# Patient Record
Sex: MALE | Race: WHITE | Employment: STUDENT | URBAN - METROPOLITAN AREA
[De-identification: names, ages, dates, MRNs, and addresses within clinical notes are randomized per-mention and may not be internally consistent; named-entity substitution may affect disease eponyms.]

---
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
## 2019-03-13 ENCOUNTER — OFFICE VISIT (OUTPATIENT)
Dept: FAMILY MEDICINE | Facility: CLINIC | Age: 19
End: 2019-03-13

## 2019-03-13 ENCOUNTER — TELEPHONE (OUTPATIENT)
Dept: FAMILY MEDICINE | Facility: CLINIC | Age: 19
End: 2019-03-13

## 2019-03-13 VITALS
OXYGEN SATURATION: 97 % | TEMPERATURE: 98 F | HEART RATE: 54 BPM | RESPIRATION RATE: 16 BRPM | HEIGHT: 72 IN | DIASTOLIC BLOOD PRESSURE: 79 MMHG | SYSTOLIC BLOOD PRESSURE: 139 MMHG | WEIGHT: 185 LBS | BODY MASS INDEX: 25.06 KG/M2

## 2019-03-13 DIAGNOSIS — J31.0 CHRONIC RHINITIS: Primary | ICD-10-CM

## 2019-03-13 DIAGNOSIS — Z23 NEED FOR HPV VACCINE: ICD-10-CM

## 2019-03-13 PROCEDURE — 99213 OFFICE O/P EST LOW 20 MIN: CPT | Mod: 25 | Performed by: FAMILY MEDICINE

## 2019-03-13 PROCEDURE — 90471 IMMUNIZATION ADMIN: CPT | Performed by: FAMILY MEDICINE

## 2019-03-13 PROCEDURE — 90651 9VHPV VACCINE 2/3 DOSE IM: CPT | Performed by: FAMILY MEDICINE

## 2019-03-13 ASSESSMENT — MIFFLIN-ST. JEOR: SCORE: 1887.15

## 2019-03-13 NOTE — NURSING NOTE
Screening Questionnaire for Adult Immunization    Are you sick today?   No   Do you have allergies to medications, food, a vaccine component or latex?   No   Have you ever had a serious reaction after receiving a vaccination?   No   Do you have a long-term health problem with heart disease, lung disease, asthma, kidney disease, metabolic disease (e.g. diabetes), anemia, or other blood disorder?   No   Do you have cancer, leukemia, HIV/AIDS, or any other immune system problem?   No   In the past 3 months, have you taken medications that affect  your immune system, such as prednisone, other steroids, or anticancer drugs; drugs for the treatment of rheumatoid arthritis, Crohn s disease, or psoriasis; or have you had radiation treatments?   No   Have you had a seizure, or a brain or other nervous system problem?   No   During the past year, have you received a transfusion of blood or blood     products, or been given immune (gamma) globulin or antiviral drug?   No   For women: Are you pregnant or is there a chance you could become        pregnant during the next month?   No   Have you received any vaccinations in the past 4 weeks?   No     Immunization questionnaire answers were all negative.        Per orders of Dr. Dr. Shaila Morocho, injection of gadasil 9  given by Chrystal Khanna. Patient instructed to remain in clinic for 15 minutes afterwards, and to report any adverse reaction to me immediately.       Screening performed by Chrystal Khanna on 3/13/2019 at 1:50 PM.

## 2019-03-13 NOTE — PROGRESS NOTES
SUBJECTIVE:   Stanton Murphy is a 18 year old male who presents to clinic today for the following health issues:      ENT Symptoms    Sinus issues for the past couple years  Phlegm, sore throat and congestion  No facial pain or teeth pain to suggest chronic sinusitis      hpv injection needs #2 today            Problem list and histories reviewed & adjusted, as indicated.  Additional history: as documented    Labs reviewed in EPIC    Reviewed and updated as needed this visit by clinical staff       Reviewed and updated as needed this visit by Provider         ROS:  Constitutional, HEENT, cardiovascular, pulmonary, gi and gu systems are negative, except as otherwise noted.    OBJECTIVE:     /79   Pulse 54   Temp 98  F (36.7  C) (Oral)   Resp 16   Ht 1.829 m (6')   Wt 83.9 kg (185 lb)   SpO2 97%   BMI 25.09 kg/m    Body mass index is 25.09 kg/m .  GENERAL: healthy, alert and no distress  EYES: Eyes grossly normal to inspection, PERRL and conjunctivae and sclerae normal  HENT: ear canals and TM's normal, nose and mouth without ulcers or lesions  NECK: no adenopathy, no asymmetry, masses, or scars and thyroid normal to palpation  RESP: lungs clear to auscultation - no rales, rhonchi or wheezes  CV: regular rate and rhythm, normal S1 S2, no S3 or S4, no murmur, click or rub, no peripheral edema and peripheral pulses strong    Diagnostic Test Results:  none     ASSESSMENT/PLAN:     1. Chronic rhinitis  Trial of claritin/zyrtec otc, if not helpful then trial of nasal steroid for 2 weeks, if not helpful, then follow-up with allergist  - ALLERGY/ASTHMA ADULT REFERRAL    2. Need for HPV vaccine  given  - HPV, IM (9 - 26 YRS) - Gardasil 9        Shaila Garrison MD  [facility]

## 2019-03-13 NOTE — TELEPHONE ENCOUNTER
Informed patient blood type on file, not normally tested. If had surgery in the past, can check with that hospital, also if given blood, can check with [place]    Latisha CASTN, RN, CPN

## 2019-09-18 ENCOUNTER — TELEPHONE (OUTPATIENT)
Dept: FAMILY MEDICINE | Facility: CLINIC | Age: 19
End: 2019-09-18

## 2019-09-18 NOTE — TELEPHONE ENCOUNTER
No MYRANDA on file for mom so I did not return call to her.     Called patient's mobile number that identified him and left him a detailed voicemail that his mom called but I cannot speak with her.     Informed him he is free to get his 3rd HPV shot where he likes.  He should contact the pharmacy or location that he wants to get it at to ask them if they need an order from us.  Advised to call us back if any questions and again said that I don't have authorization to speak with his mom.      Genny Cota BSN, RN

## 2019-09-18 NOTE — TELEPHONE ENCOUNTER
Reason for Call:  Other Immunizations    Detailed comments: patient is due to have his 3rd HPV shot in December but is away at [facility] wondering if he can go to a pharmacy there to get this last one. Will he need an order?    Phone Number Patient can be reached at: Other phone number:  [phone]  Please call mother to discuss    Best Time:     Can we leave a detailed message on this number? YES    Call taken on 9/18/2019 at 9:25 AM by Carmina England